# Patient Record
Sex: FEMALE | Race: WHITE | URBAN - METROPOLITAN AREA
[De-identification: names, ages, dates, MRNs, and addresses within clinical notes are randomized per-mention and may not be internally consistent; named-entity substitution may affect disease eponyms.]

---

## 2018-02-12 ENCOUNTER — TRANSFERRED RECORDS (OUTPATIENT)
Dept: HEALTH INFORMATION MANAGEMENT | Facility: CLINIC | Age: 73
End: 2018-02-12

## 2018-07-10 ENCOUNTER — TRANSFERRED RECORDS (OUTPATIENT)
Dept: HEALTH INFORMATION MANAGEMENT | Facility: CLINIC | Age: 73
End: 2018-07-10

## 2018-07-16 ENCOUNTER — TRANSFERRED RECORDS (OUTPATIENT)
Dept: HEALTH INFORMATION MANAGEMENT | Facility: CLINIC | Age: 73
End: 2018-07-16

## 2018-08-30 ENCOUNTER — TRANSFERRED RECORDS (OUTPATIENT)
Dept: HEALTH INFORMATION MANAGEMENT | Facility: CLINIC | Age: 73
End: 2018-08-30

## 2018-08-30 ENCOUNTER — MEDICAL CORRESPONDENCE (OUTPATIENT)
Dept: HEALTH INFORMATION MANAGEMENT | Facility: CLINIC | Age: 73
End: 2018-08-30

## 2018-08-30 DIAGNOSIS — H33.20 RETINAL DETACHMENT, UNSPECIFIED LATERALITY: Primary | ICD-10-CM

## 2018-08-31 ENCOUNTER — TRANSFERRED RECORDS (OUTPATIENT)
Dept: HEALTH INFORMATION MANAGEMENT | Facility: CLINIC | Age: 73
End: 2018-08-31

## 2018-09-04 ENCOUNTER — OFFICE VISIT (OUTPATIENT)
Dept: OPHTHALMOLOGY | Facility: CLINIC | Age: 73
End: 2018-09-04
Attending: OPHTHALMOLOGY
Payer: MEDICARE

## 2018-09-04 DIAGNOSIS — H33.22 RETINAL DETACHMENT, LEFT: Primary | ICD-10-CM

## 2018-09-04 PROCEDURE — 92250 FUNDUS PHOTOGRAPHY W/I&R: CPT | Mod: ZF | Performed by: OPHTHALMOLOGY

## 2018-09-04 PROCEDURE — G0463 HOSPITAL OUTPT CLINIC VISIT: HCPCS | Mod: ZF

## 2018-09-04 RX ORDER — FENOFIBRATE 160 MG/1
160 TABLET ORAL
COMMUNITY
Start: 2017-09-18

## 2018-09-04 RX ORDER — FERROUS SULFATE 325(65) MG
325 TABLET ORAL
COMMUNITY
Start: 2013-08-30

## 2018-09-04 RX ORDER — AMLODIPINE BESYLATE 10 MG/1
10 TABLET ORAL
COMMUNITY
Start: 2012-08-16

## 2018-09-04 ASSESSMENT — CONF VISUAL FIELD
OS_SUPERIOR_TEMPORAL_RESTRICTION: 1
OS_SUPERIOR_NASAL_RESTRICTION: 1
OS_INFERIOR_NASAL_RESTRICTION: 1
OS_INFERIOR_TEMPORAL_RESTRICTION: 1

## 2018-09-04 ASSESSMENT — REFRACTION_WEARINGRX
OS_CYLINDER: +1.50
OD_ADD: +2.75
OD_AXIS: 115
OD_SPHERE: -0.75
OS_ADD: +2.75
OD_CYLINDER: +1.00
OS_SPHERE: -1.00
OS_AXIS: 085

## 2018-09-04 ASSESSMENT — CUP TO DISC RATIO
OS_RATIO: 0.3
OD_RATIO: 0.3

## 2018-09-04 ASSESSMENT — EXTERNAL EXAM - LEFT EYE: OS_EXAM: NORMAL

## 2018-09-04 ASSESSMENT — VISUAL ACUITY
OD_CC+: -1
OD_CC: 20/20
CORRECTION_TYPE: GLASSES
OS_CC: LP
METHOD: SNELLEN - LINEAR

## 2018-09-04 ASSESSMENT — TONOMETRY
IOP_METHOD: ICARE
OD_IOP_MMHG: 12
OS_IOP_MMHG: 21

## 2018-09-04 ASSESSMENT — EXTERNAL EXAM - RIGHT EYE: OD_EXAM: NORMAL

## 2018-09-04 ASSESSMENT — SLIT LAMP EXAM - LIDS
COMMENTS: NORMAL
COMMENTS: NORMAL

## 2018-09-04 NOTE — NURSING NOTE
Chief Complaints and History of Present Illnesses   Patient presents with     Consult For     RD LE     HPI    Additional Referring Providers:  Dr. Chloe Galloway    Affected eye(s):  Left   Symptoms:     Blurred vision   Decreased vision      Duration:  3 weeks   Frequency:  Constant       Do you have eye pain now?:  No      Comments:  Sent here at request of Dr. Chloe Galloway. Pt has Hx of RD Repair LE 7-16-18. Recently told it is now detached again. Vision is very poor 1 month since surgery. Notes that she has stopped using eye drops last week. GEOVANNA HARDY, COA 12:26 PM 09/04/2018

## 2018-09-04 NOTE — LETTER
9/4/2018       RE: Becky Burks  [address] [place] [address]     Dear Colleague,    Thank you for referring your patient, Becky Burks, to the EYE CLINIC at [facility]. Please see a copy of my visit note below.    CC -   RD OS    INTERVAL HISTORY - Initial visit, referred by Dr. 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  RD OS   - recurrent after prior repair 7/2018   - PVR with macular fold   - very guarded prognosis     - advise PPV/MS/SO   - possible retinectomy inferior   - oil d/t multiple holes & high risk of PVR despite silicone lens     - r/b/a d/w patient: vision loss, blindness, infection, bleeding   - retinal detachment, need for more surgeries, need for bubble and bubble restrictions   - participation of fellow or resident   - likely oil damage to lens      2.  PVD OD    return to clinic: post-op    ATTESTATION   Attending Attestation: Complete documentation of historical and exam elements from today's encounter can be found in the full encounter summary report (not reduplicated in this progress note).  I personally obtained the chief complaint(s) and history of present illness.  I confirmed and edited as necessary the review of systems, past medical/surgical history, family history, social history, and examination findings as documented by others; and I examined the patient myself.  I personally reviewed the relevant tests, images, and reports as documented above.  I formulated and edited as necessary the assessment and plan and discussed the findings and management plan with the patient and family Debbie Mason MD, PhD      Base Eye Exam     Visual Acuity (Snellen - Linear)      Right Left    Dist cc 20/20 -1 LP       Correction:  Glasses      Tonometry ([facility], 12:27 PM)      Right Left   Pressure 12 21         Pupils      Dark Light React   Right 4 3    Left   Dialated          Visual Fields      Left Right   Restrictions Total superior temporal, inferior temporal, superior nasal, inferior nasal deficiencies          Extraocular Movement      Right Left   Result Full Full         Neuro/Psych     Oriented x3:  Yes    Mood/Affect:  Normal      Dilation     Both eyes:  1.0% Mydriacyl, 2.5% Carlos Synephrine @ 12:27 PM            Slit Lamp and Fundus Exam     External Exam      Right Left    External Normal Normal      Slit Lamp Exam      Right Left    Lids/Lashes Normal Normal    Conjunctiva/Sclera White and quiet White and quiet    Cornea Clear Clear    Anterior Chamber Deep and quiet Deep and quiet    Iris Dilated Dilated    Lens PCL PCL    Vitreous PVD 25% gas      Fundus Exam      Right Left    Disc Normal Normal    C/D Ratio 0.3 0.3    Macula Normal detached, large macular fold through fovea    Vessels Normal Normal    Periphery Normal , detached inferior from 2:00 to 10:00, multiple holes temporal/inferior/nasal in prior laser spots in mid-periphery            Refraction     Wearing Rx      Sphere Cylinder Axis Add   Right -0.75 +1.00 115 +2.75   Left -1.00 +1.50 085 +2.75                   Again, thank you for allowing me to participate in the care of your patient.      Sincerely,    Debbie Mason MD, PhD  , Vitreoretinal Surgery  Department of Ophthalmology & Visual Neurosciences  [facility]

## 2018-09-04 NOTE — MR AVS SNAPSHOT
After Visit Summary   2018    Becky Burks    MRN: [id number]           Patient Information     Date Of Birth          1945        Visit Information        Provider Department      2018 12:30 PM Debbie Mason MD [facility]        Today's Diagnoses     Retinal detachment, left    -  1       Follow-ups after your visit        Follow-up notes from your care team     Return for OCT OU.      Your next 10 appointments already scheduled     Sep 07, 2018  9:00 AM CDT   Post-Op with Debbie Mason MD   Eye Clinic ([facility])    [address] [address]  [facility] [address]   [phone]              Who to contact     Please call your clinic at [phone] to:    Ask questions about your health    Make or cancel appointments    Discuss your medicines    Learn about your test results    Speak to your doctor            Additional Information About Your Visit        MyChart Information     [employer] is an electronic gateway that provides easy, online access to your medical records. With [employer], you can request a clinic appointment, read your test results, renew a prescription or communicate with your care team.     To sign up for [employer] visit the website at [email or web address]   You will be asked to enter the access code listed below, as well as some personal information. Please follow the directions to create your username and password.     Your access code is: [email or web address]  Expires: 2018  6:31 AM     Your access code will  in 90 days. If you need help or a new code, please contact your [facility] or call [phone] for assistance.        Care EveryWhere ID     This is your Care EveryWhere ID. This could be used by other organizations to access your [place] medical records  [id number]         Blood Pressure from Last 3 Encounters:   No data found for BP    Weight from  Last 3 Encounters:   No data found for Wt              We Performed the Following     Fundus Photos OU (both eyes)        Primary Care Provider Office Phone # Fax #    Mame Espinosa, DUNCAN [phone]       [facility] [address] [facility] [address]        Equal Access to Services     NAUN SCHREIBER : Hadii aad ku hadasho Soomaali, waaxda luqadaha, qaybta kaalmada adeegyada, waxay idiin hayaan adethang khmariahbradford layelitza . So [facility] [phone].    ATENCIÓN: Si habla español, tiene a syed disposición servicios gratuitos de asistencia lingüística. Niraj al [phone].    We comply with applicable federal civil rights laws and [place] laws. We do not discriminate on the basis of race, color, national origin, age, disability, sex, sexual orientation, or gender identity.            Thank you!     Thank you for choosing EYE CLINIC  for your care. Our goal is always to provide you with excellent care. Hearing back from our patients is one way we can continue to improve our services. Please take a few minutes to complete the written survey that you may receive in the mail after your visit with us. Thank you!             Your Updated Medication List - Protect others around you: Learn how to safely use, store and throw away your medicines at www.disposemymeds.org.          This list is accurate as of 9/4/18  5:24 PM.  Always use your most recent med list.                   Brand Name Dispense Instructions for use Diagnosis    amLODIPine 10 MG tablet    NORVASC     10 mg        fenofibrate 160 MG tablet      160 mg        ferrous sulfate 325 (65 Fe) MG tablet    IRON     325 mg

## 2018-09-04 NOTE — PROGRESS NOTES
CC -   RD OS    INTERVAL HISTORY - Initial visit, referred by Dr. 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  RD OS   - recurrent after prior repair 7/2018 with PPV/SBP/FGx   - PVR with macular fold   - very guarded prognosis     - advise PPV/MS/SO   - possible retinectomy inferior   - oil d/t multiple holes & high risk of PVR despite silicone lens     - r/b/a d/w patient: vision loss, blindness, infection, bleeding   - retinal detachment, need for more surgeries, need for bubble and bubble restrictions   - participation of fellow or resident   - likely oil damage to lens      2.  PVD OD        return to clinic: post-op    ATTESTATION     Attending Attestation:     Complete documentation of historical and exam elements from today's encounter can be found in the full encounter summary report (not reduplicated in this progress note).  I personally obtained the chief complaint(s) and history of present illness.  I confirmed and edited as necessary the review of systems, past medical/surgical history, family history, social history, and examination findings as documented by others; and I examined the patient myself.  I personally reviewed the relevant tests, images, and reports as documented above.  I formulated and edited as necessary the assessment and plan and discussed the findings and management plan with the patient and family    Debbie Mason MD, PhD  , Vitreoretinal Surgery  Department of Ophthalmology  [facility]

## 2018-09-06 ENCOUNTER — TRANSFERRED RECORDS (OUTPATIENT)
Dept: HEALTH INFORMATION MANAGEMENT | Facility: CLINIC | Age: 73
End: 2018-09-06

## 2018-09-07 ENCOUNTER — OFFICE VISIT (OUTPATIENT)
Dept: OPHTHALMOLOGY | Facility: CLINIC | Age: 73
End: 2018-09-07
Attending: OPHTHALMOLOGY
Payer: MEDICARE

## 2018-09-07 DIAGNOSIS — Z98.890 POST-OPERATIVE STATE: Primary | ICD-10-CM

## 2018-09-07 PROCEDURE — G0463 HOSPITAL OUTPT CLINIC VISIT: HCPCS | Mod: ZF

## 2018-09-07 RX ORDER — DORZOLAMIDE HYDROCHLORIDE AND TIMOLOL MALEATE 20; 5 MG/ML; MG/ML
1 SOLUTION/ DROPS OPHTHALMIC 2 TIMES DAILY
Qty: 1 BOTTLE | Refills: 3 | Status: SHIPPED | OUTPATIENT
Start: 2018-09-07

## 2018-09-07 ASSESSMENT — SLIT LAMP EXAM - LIDS: COMMENTS: NORMAL

## 2018-09-07 ASSESSMENT — TONOMETRY
OD_IOP_MMHG: 14
IOP_METHOD: ICARE
OS_IOP_MMHG: 25

## 2018-09-07 ASSESSMENT — VISUAL ACUITY
CORRECTION_TYPE: GLASSES
OD_CC+: -1
OS_SC: CF @ 3FT
OD_CC: 20/20
METHOD: SNELLEN - LINEAR

## 2018-09-07 ASSESSMENT — CUP TO DISC RATIO: OS_RATIO: 0.3

## 2018-09-07 ASSESSMENT — EXTERNAL EXAM - LEFT EYE: OS_EXAM: NORMAL

## 2018-09-07 NOTE — MR AVS SNAPSHOT
After Visit Summary   9/7/2018    Becky Burks    MRN: [id number]           Patient Information     Date Of Birth          1945        Visit Information        Provider Department      9/7/2018 9:00 AM Debbie Mason MD [facility]        Today's Diagnoses     Post-operative state    -  1      Care Instructions    POST-OPERATIVE INSTRUCTIONS FOLLOWING RETINA SURGERY    Debbie Mason MD, PhD  Department of Ophthalmology  [facility]  [phone]      ACTIVITY:    No heavy lifting for 2 weeks after surgery.    No swimming for 3 weeks after surgery.    It is OK for you to shower, to wash your face, and to wash your hair.  Allow the shower to hit the top of your head and wash down your face.  Do not hit your eye directly with the jet from the shower.    Keep your eye covered with the shield when you sleep for 1 week after surgery.  If your shield has a tab, it is designed to go over the bridge of your nose.  Place one piece of tape diagonally from the center of your forehead to the side of your cheek to secure the shield.     During the daytime, you can use either the shield or your regular eyeglasses or sunglasses to protect your eye.    GAS BUBBLE POSITIONING REQUIREMENTS  Positioning requirements will be discussed with you if you have an oil or gas bubble in your eye:    Position:    Face Down    Maintain this positioning for 10 days.      When positioning, it is OK to take brief breaks to eat, stretch, clean up, etc.  Try to position for 90% of the time (5 minute break per hour on average).    Do not lay flat on your back until the bubble is gone.    Do not fly on an airplane or travel to elevations more than 1000 feet above [place] until the bubble is gone.    Keep the green bracelet on your wrist until the bubble is gone.  If it breaks, we can give you a replacement      EYE DROPS  Use these drops after surgery.  When using more than one drop, separate them by  3 minutes between drops.  Common times to place drops are breakfast, lunch, dinner, and bedtime.  Do not stop your drops without discussing with our office.  If you run out before your appointment, call and we will send in a refill.      Atropine ---  2 times per day  Polytrim (polymyxin B / trimethoprim) --- 4 times per day  Pred Forte (prednisolone acetate) --- 4 times per day  Cosopt (Dorzolamide/timolol) --- 2 times per day    WHAT TO EXPECT  It is common for the eye to to have a blood tinged discharge for a few days after surgery  It may feel irritated (as if something were in your eye), for there to be clear discharge (thicker in the mornings upon awakening), and for it to be bloodshot for 2-3 weeks following retina surgery.   Your vision is also commonly decreased during this time due to the bubble.          WHAT TO WATCH OUT FOR  If you experience any of the following, you should call immediately:    Increasing pain    Increasing nausea or vomiting    Increasing redness    Worsening or darkening of the vision    New flashing lights or floaters      For any of the symptoms listed above, or for other concerns, call [phone] and ask to speak to the clinic nurse.  If you call after hours, follow to prompts to reach the doctor on call.                    Follow-ups after your visit        Follow-up notes from your care team     Return in about 1 week (around 9/14/2018).      Who to contact     Please call your clinic at [phone] to:    Ask questions about your health    Make or cancel appointments    Discuss your medicines    Learn about your test results    Speak to your doctor            Additional Information About Your Visit        [employer] Information     [employer] is an electronic gateway that provides easy, online access to your medical records. With [employer], you can request a clinic appointment, read your test results, renew a prescription or communicate with your care team.     To sign up for  Leannat visit the website at [email or web address]   You will be asked to enter the access code listed below, as well as some personal information. Please follow the directions to create your username and password.     Your access code is: [email or web address]  Expires: 2018  6:31 AM     Your access code will  in 90 days. If you need help or a new code, please contact your [facility] or call [phone] for assistance.        Care EveryWhere ID     This is your Care EveryWhere ID. This could be used by other organizations to access your [place] medical records  [id number]         Blood Pressure from Last 3 Encounters:   No data found for BP    Weight from Last 3 Encounters:   No data found for Wt              Today, you had the following     No orders found for display         Today's Medication Changes          These changes are accurate as of 18 11:00 AM.  If you have any questions, ask your nurse or doctor.               Start taking these medicines.        Dose/Directions    dorzolamide-timolol 2-0.5 % ophthalmic solution   Commonly known as:  COSOPT   Used for:  Post-operative state        Dose:  1 drop   Place 1 drop Into the left eye 2 times daily   Quantity:  1 Bottle   Refills:  3            Where to get your medicines      These medications [facility] to [facility] [id number]     Phone:  [phone]     dorzolamide-timolol 2-0.5 % ophthalmic solution                Primary Care Provider Office Phone # Fax #    Mame Espinosa [phone]       [address] [address]        Equal Access to Services     RON SCHREIBER : Hadii betty Doe, waaxda luqadaha, qaybta kaalmada peace, radha rubio. So [facility] [phone].    ATENCIÓN: Si habla español, tiene a syed disposición  servicios gratuitos de asistencia lingüística. Niraj perez [phone].    We comply with applicable federal civil rights laws and [place] laws. We do not discriminate on the basis of race, color, national origin, age, disability, sex, sexual orientation, or gender identity.            Thank you!     Thank you for choosing EYE CLINIC  for your care. Our goal is always to provide you with excellent care. Hearing back from our patients is one way we can continue to improve our services. Please take a few minutes to complete the written survey that you may receive in the mail after your visit with us. Thank you!             Your Updated Medication List - Protect others around you: Learn how to safely use, store and throw away your medicines at www.disposemymeds.org.          This list is accurate as of 9/7/18 11:00 AM.  Always use your most recent med list.                   Brand Name Dispense Instructions for use Diagnosis    amLODIPine 10 MG tablet    NORVASC     10 mg        dorzolamide-timolol 2-0.5 % ophthalmic solution    COSOPT    1 Bottle    Place 1 drop Into the left eye 2 times daily    Post-operative state       fenofibrate 160 MG tablet      160 mg        ferrous sulfate 325 (65 Fe) MG tablet    IRON     325 mg

## 2018-09-07 NOTE — PATIENT INSTRUCTIONS
POST-OPERATIVE INSTRUCTIONS FOLLOWING RETINA SURGERY    Debbie Mason MD, PhD  Department of Ophthalmology  [facility]  [phone]      ACTIVITY:    No heavy lifting for 2 weeks after surgery.    No swimming for 3 weeks after surgery.    It is OK for you to shower, to wash your face, and to wash your hair.  Allow the shower to hit the top of your head and wash down your face.  Do not hit your eye directly with the jet from the shower.    Keep your eye covered with the shield when you sleep for 1 week after surgery.  If your shield has a tab, it is designed to go over the bridge of your nose.  Place one piece of tape diagonally from the center of your forehead to the side of your cheek to secure the shield.     During the daytime, you can use either the shield or your regular eyeglasses or sunglasses to protect your eye.    GAS BUBBLE POSITIONING REQUIREMENTS  Positioning requirements will be discussed with you if you have an oil or gas bubble in your eye:    Position:    Face Down    Maintain this positioning for 10 days.      When positioning, it is OK to take brief breaks to eat, stretch, clean up, etc.  Try to position for 90% of the time (5 minute break per hour on average).    Do not lay flat on your back until the bubble is gone.    Do not fly on an airplane or travel to elevations more than 1000 feet above [place] until the bubble is gone.    Keep the green bracelet on your wrist until the bubble is gone.  If it breaks, we can give you a replacement      EYE DROPS  Use these drops after surgery.  When using more than one drop, separate them by 3 minutes between drops.  Common times to place drops are breakfast, lunch, dinner, and bedtime.  Do not stop your drops without discussing with our office.  If you run out before your appointment, call and we will send in a refill.      Atropine ---  2 times per day  Polytrim (polymyxin B / trimethoprim) --- 4 times per day  Pred Forte  (prednisolone acetate) --- 4 times per day  Cosopt (Dorzolamide/timolol) --- 2 times per day    WHAT TO EXPECT  It is common for the eye to to have a blood tinged discharge for a few days after surgery  It may feel irritated (as if something were in your eye), for there to be clear discharge (thicker in the mornings upon awakening), and for it to be bloodshot for 2-3 weeks following retina surgery.   Your vision is also commonly decreased during this time due to the bubble.          WHAT TO WATCH OUT FOR  If you experience any of the following, you should call immediately:    Increasing pain    Increasing nausea or vomiting    Increasing redness    Worsening or darkening of the vision    New flashing lights or floaters      For any of the symptoms listed above, or for other concerns, call [phone] and ask to speak to the clinic nurse.  If you call after hours, follow to prompts to reach the doctor on call.

## 2018-09-07 NOTE — LETTER
9/7/2018       RE: Becky Burks  [address] [place] [address] [address] [place] [address]     Dear Colleague,    Thank you for referring your patient, Becky Burks, to the EYE CLINIC at [facility]. Please see a copy of my visit note below.    CC -   Post Op OS    INTERVAL HISTORY - POD #1 OS s/p RD repair 9/6/18.  No eye pain, OK with positioning but has neck & back pain referred by Dr. 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  POD #1 OS   - s/p PPV/SO/EL/inf retinectomy for recurrent RD with PVR   - IOP mild elevation, retina flat, no infection     - PF/PT/AT/Cosopt gtts   - face down   - f/u 1 week with Dr. Corona    2.  PVD OD    return to clinic: post-op 1 week with Dr. Corona  ATTESTATION   Attending Attestation: Complete documentation of historical and exam elements from today's encounter can be found in the full encounter summary report (not reduplicated in this progress note).  I personally obtained the chief complaint(s) and history of present illness.  I confirmed and edited as necessary the review of systems, past medical/surgical history, family history, social history, and examination findings as documented by others; and I examined the patient myself.  I personally reviewed the relevant tests, images, and reports as documented above.  I formulated and edited as necessary the assessment and plan and discussed the findings and management plan with the patient and family. Debbie Mason MD, PhD      Base Eye Exam     Visual Acuity (Snellen - Linear)      Right Left   Dist sc  CF @ 3ft   Dist cc 20/20 -1        Correction:  Glasses      Tonometry ([facility], 9:40 AM)      Right  Left   Pressure 14 25         Neuro/Psych     Oriented x3:  Yes    Mood/Affect:  Normal            Slit Lamp and Fundus Exam     External Exam      Right Left    External  Normal      Slit Lamp Exam      Right Left    Lids/Lashes  Normal    Conjunctiva/Sclera  1+ inj/oneida    Cornea  Clear, smal lheme on endo    Anterior Chamber  deep, 1+ cell/flare, no oil    Iris  Dilated    Lens  PCL    Vitreous  SO good meniscus      Fundus Exam      Right Left    Disc  Normal    C/D Ratio  0.3    Macula  flat    Vessels  Normal    Periphery  , flat, large inferior retinectomy with pexy, mild heme at edge                Again, thank you for allowing me to participate in the care of your patient.      Sincerely,    Debbie Mason MD, PhD  , Vitreoretinal Surgery  Department of Ophthalmology & Visual Neurosciences  [facility]

## 2018-09-07 NOTE — PROGRESS NOTES
CC -   Post Op OS    INTERVAL HISTORY - POD #1 OS s/p RD repair 9/6/18.  No eye pain, OK with positioning but has neck & back pain  referred by Dr. 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  POD #1 OS   - s/p PPV/SO/EL/inf retinectomy for recurrent RD with PVR   - IOP mild elevation, retina flat, no infection     - PF/PT/AT/Cosopt gtts   - face down   - f/u 1 week with Dr. Corona      2.  PVD OD        return to clinic: post-op 1 week with Dr. Corona    ATTESTATION     Attending Attestation:     Complete documentation of historical and exam elements from today's encounter can be found in the full encounter summary report (not reduplicated in this progress note).  I personally obtained the chief complaint(s) and history of present illness.  I confirmed and edited as necessary the review of systems, past medical/surgical history, family history, social history, and examination findings as documented by others; and I examined the patient myself.  I personally reviewed the relevant tests, images, and reports as documented above.  I formulated and edited as necessary the assessment and plan and discussed the findings and management plan with the patient and family    Debbie Mason MD, PhD  , Vitreoretinal Surgery  Department of Ophthalmology  [facility]